# Patient Record
Sex: MALE | URBAN - METROPOLITAN AREA
[De-identification: names, ages, dates, MRNs, and addresses within clinical notes are randomized per-mention and may not be internally consistent; named-entity substitution may affect disease eponyms.]

---

## 2021-03-31 ENCOUNTER — IMMUNIZATION (OUTPATIENT)
Dept: FAMILY PLANNING/WOMEN'S HEALTH CLINIC | Facility: IMMUNIZATION CENTER | Age: 66
End: 2021-03-31

## 2021-03-31 DIAGNOSIS — Z23 ENCOUNTER FOR VACCINATION: Primary | ICD-10-CM

## 2021-03-31 PROCEDURE — 0001A PFIZER SARS-COV-2 VACCINE: CPT

## 2021-03-31 PROCEDURE — 91300 PFIZER SARS-COV-2 VACCINE: CPT

## 2021-04-22 ENCOUNTER — IMMUNIZATION (OUTPATIENT)
Dept: FAMILY PLANNING/WOMEN'S HEALTH CLINIC | Facility: IMMUNIZATION CENTER | Age: 66
End: 2021-04-22
Payer: MEDICARE

## 2021-04-22 DIAGNOSIS — Z23 ENCOUNTER FOR VACCINATION: Primary | ICD-10-CM

## 2021-04-22 PROCEDURE — 91300 PFIZER SARS-COV-2 VACCINE: CPT | Performed by: INTERNAL MEDICINE

## 2021-04-22 PROCEDURE — 0002A PFIZER SARS-COV-2 VACCINE: CPT | Performed by: INTERNAL MEDICINE

## 2022-11-20 ENCOUNTER — OFFICE VISIT (OUTPATIENT)
Dept: URGENT CARE | Facility: CLINIC | Age: 67
End: 2022-11-20
Payer: MEDICARE

## 2022-11-20 VITALS
TEMPERATURE: 97.4 F | SYSTOLIC BLOOD PRESSURE: 138 MMHG | WEIGHT: 259 LBS | HEART RATE: 90 BPM | HEIGHT: 69 IN | DIASTOLIC BLOOD PRESSURE: 82 MMHG | BODY MASS INDEX: 38.36 KG/M2 | RESPIRATION RATE: 20 BRPM | OXYGEN SATURATION: 91 %

## 2022-11-20 DIAGNOSIS — L03.115 CELLULITIS OF RIGHT LOWER LEG: ICD-10-CM

## 2022-11-20 DIAGNOSIS — Z72.0 TOBACCO ABUSE: ICD-10-CM

## 2022-11-20 PROCEDURE — 99213 OFFICE O/P EST LOW 20 MIN: CPT | Performed by: EMERGENCY MEDICINE

## 2022-11-20 RX ORDER — CEPHALEXIN 500 MG/1
500 CAPSULE ORAL 3 TIMES DAILY
Qty: 30 CAPSULE | Refills: 0 | Status: SHIPPED | OUTPATIENT
Start: 2022-11-20 | End: 2022-11-30

## 2022-11-20 RX ORDER — SULFAMETHOXAZOLE AND TRIMETHOPRIM 800; 160 MG/1; MG/1
1 TABLET ORAL 2 TIMES DAILY
Qty: 20 TABLET | Refills: 0 | Status: SHIPPED | OUTPATIENT
Start: 2022-11-20 | End: 2022-11-30

## 2022-11-20 ASSESSMENT — ENCOUNTER SYMPTOMS
SENSORY CHANGE: 1
FEVER: 0
SHORTNESS OF BREATH: 1

## 2022-11-21 NOTE — PROGRESS NOTES
"  Subjective:     Alex Branch  is a 66 y.o. male who presents for Leg Pain (Possible leg infection,right,painful,x3 weeks)       Patient is a 66-year-old male who presents complaining of a leg infection for 3 weeks.  He believes he is diabetic, but is never been diagnosed as such, does not take anything for diabetes.  He states he is planning to lose weight.  He states he has not seen a primary care doctor in years, because he cannot afford it having been unemployed for some time.  He states diabetes does run in his family.  He is also a heavy smoker.  He denies any history of wheezing, or inhaler use.  He states he knows he also has neuropathy, as though that has not been formally diagnosed either, he states the sensation is different in his feet, he feels like he is walking on pads.  He states he has been doing wound care on his leg with hand  and it has become more red and is weeping.  He denies any systemic symptoms, any fevers nausea vomiting.  Patient states he is just here for antibiotics, is not interested in establishing primary care, states he already knows what he has to do.  However he would like a COVID and a flu shot.   Review of Systems   Constitutional:  Negative for fever.   Respiratory:  Positive for shortness of breath (no change baseline, pt does not wish to address today).    Skin:         Redness, swelling, blistering of both lower legs, some open wounds that are weeping on the right.   Neurological:  Positive for sensory change (\"neuropathy\" in feet per patient).   All other systems reviewed and are negative. No Known Allergies  History reviewed. No pertinent past medical history.     Objective:   /82 (BP Location: Right arm, Patient Position: Sitting, BP Cuff Size: Adult)   Pulse 90   Temp 36.3 °C (97.4 °F) (Temporal)   Resp 20   Ht 1.753 m (5' 9\")   Wt 117 kg (259 lb)   SpO2 91%   BMI 38.25 kg/m²   Physical Exam  Vitals and nursing note reviewed.   Constitutional:      " " Appearance: Normal appearance. He is obese. He is not ill-appearing, toxic-appearing or diaphoretic.      Comments: Patient declined to remove his pants for a full exam, refused the drape offered by MA for modesty to allow a complete exam.  Strong odor of tobacco smoke in room.   HENT:      Head: Normocephalic and atraumatic.      Nose: No rhinorrhea.   Eyes:      General: No scleral icterus.  Cardiovascular:      Rate and Rhythm: Normal rate and regular rhythm.      Heart sounds: Normal heart sounds. No murmur heard.  Pulmonary:      Effort: Pulmonary effort is normal. No respiratory distress.      Breath sounds: Normal breath sounds. No wheezing or rhonchi.   Skin:     General: Skin is warm.      Findings: No rash.      Comments: Unable to visualize lower legs above the proximal lower leg, patient refused to take off his jeans, stated \"there is nothing to see above\" .  both lower legs erythematous, calves swollen with 1+ pitting edema, symmetric.  Several open wounds anteriorly mid shin on the right up to 1 x 5 cm in size, none with purulent drainage, just serosanguineous drainage.  Warm to the touch.  Blistering on the left lower leg, and 1 small open wound.   Neurological:      Mental Status: He is alert and oriented to person, place, and time.   Psychiatric:         Mood and Affect: Mood normal.         Behavior: Behavior normal.         Thought Content: Thought content normal.         Assessment/Plan:     Encounter Diagnoses   Name Primary?    Cellulitis of right lower leg     Tobacco abuse        Patient with some cellulitis of the lower leg, no evidence of systemic infection, possible diabetic versus tobacco related peripheral vascular disease/neuropathy causing erysipelas.  Patient limited the amount of exam that was possible today, and does not appear particularly interested in establishing care as an outpatient.  However did encourage him in smoking cessation and in further evaluating whether he has " diabetes or not.  We will check a blood glucose here today to see if that is elevated.    Assessment    1. Cellulitis of right lower leg  - Referral to establish with [facility]  - cephALEXin (KEFLEX) 500 MG Cap; Take 1 Capsule by mouth 3 times a day for 10 days.  Dispense: 30 Capsule; Refill: 0  - sulfamethoxazole-trimethoprim (BACTRIM DS) 800-160 MG tablet; Take 1 Tablet by mouth 2 times a day for 10 days.  Dispense: 20 Tablet; Refill: 0    2. Tobacco abuse  - Referral to establish with [facility]    Blood glucose here is normal.  Referral was made for primary care for patient to establish care, the patient was referred to the health department/drugstore for his immunizations.  Patient will be treated with a combination of Bactrim and Keflex for his cellulitis of his right lower extremity, but I have very strongly emphasized with the patient that if he is diabetic, antibiotics alone will not help, and I also emphasized that some of his neuropathy and lower extremity symptoms may be due to poor perfusion related to his heavy smoking, and he should strongly consider quitting.  Smoking cessation counseling and lifestyle habit counseling for 6 minutes of today's encounter.    See AVS Instructions below for written guidance provided to patient on after-visit management and care in addition to our verbal discussion during the visit.    Please note that this dictation was created using voice recognition software. I have attempted to correct all errors, but there may be sound-alike, spelling, grammar and possibly content errors that I did not discover before finalizing the note.

## 2022-11-21 NOTE — PATIENT INSTRUCTIONS
You may take acetaminophen, ibuprofen or naproxen as directed for pain.  You may alternate 650-1000 mg (2 tabs) acetaminophen with 600 mg (3 tabs) ibuprofen if needed for pain.     Do wound care with soap and water at least twice daily, apply bacitracin ointment to open areas.    Elevate legs as much as possible to limit swelling.    Wear socks inside your shoes

## 2022-11-23 ENCOUNTER — TELEPHONE (OUTPATIENT)
Dept: HEALTH INFORMATION MANAGEMENT | Facility: OTHER | Age: 67
End: 2022-11-23
Payer: MEDICARE